# Patient Record
Sex: FEMALE | Race: WHITE | NOT HISPANIC OR LATINO
[De-identification: names, ages, dates, MRNs, and addresses within clinical notes are randomized per-mention and may not be internally consistent; named-entity substitution may affect disease eponyms.]

---

## 2022-05-31 ENCOUNTER — APPOINTMENT (OUTPATIENT)
Dept: OTOLARYNGOLOGY | Facility: CLINIC | Age: 72
End: 2022-05-31
Payer: MEDICARE

## 2022-05-31 VITALS
HEIGHT: 61 IN | TEMPERATURE: 96.8 F | SYSTOLIC BLOOD PRESSURE: 130 MMHG | WEIGHT: 111.4 LBS | HEART RATE: 75 BPM | BODY MASS INDEX: 21.03 KG/M2 | DIASTOLIC BLOOD PRESSURE: 78 MMHG

## 2022-05-31 DIAGNOSIS — Z83.3 FAMILY HISTORY OF DIABETES MELLITUS: ICD-10-CM

## 2022-05-31 DIAGNOSIS — Z86.79 PERSONAL HISTORY OF OTHER DISEASES OF THE CIRCULATORY SYSTEM: ICD-10-CM

## 2022-05-31 DIAGNOSIS — Z86.39 PERSONAL HISTORY OF OTHER ENDOCRINE, NUTRITIONAL AND METABOLIC DISEASE: ICD-10-CM

## 2022-05-31 PROBLEM — Z00.00 ENCOUNTER FOR PREVENTIVE HEALTH EXAMINATION: Status: ACTIVE | Noted: 2022-05-31

## 2022-05-31 PROCEDURE — 99203 OFFICE O/P NEW LOW 30 MIN: CPT

## 2022-05-31 NOTE — CONSULT LETTER
[Dear  ___] : Dear  [unfilled], [Courtesy Letter:] : I had the pleasure of seeing your patient, [unfilled], in my office today. [Consult Closing:] : Thank you very much for allowing me to participate in the care of this patient.  If you have any questions, please do not hesitate to contact me. [Sincerely,] : Sincerely, [FreeTextEntry3] : Suresh Quezada MD\par Department of Otolaryngology - Head and Neck Surgery\par [facility]

## 2022-05-31 NOTE — PHYSICAL EXAM
[FreeTextEntry1] : Ad: EAC narrow w edema, but patent; thick hard cerumen lodged in proximal EAC against TM, cannot remove, TM not visible\par As: EAC clear, TM intact, ME clear [Midline] : trachea located in midline position [Normal] : no rashes [FreeTextEntry2] : mild pain below right ear posterior to ramus; parotid areas flat

## 2022-05-31 NOTE — ASSESSMENT
[FreeTextEntry1] : 71F here for initial evaluation. Over the past 2 days or so she c/o right ear pain with diminished hearing and clogged feeling. Though the pain started 2 days ago, the clogged feeling started around 2 weeks ago after getting water in her right ear during showering and persisted. There is no drainage, tinnitus or vertigo. The left ear is fine.\par On exam, the right EAC is narrow due to edema, but patent w no granulation or pus; there is thick hard cerumen lodged in proximal EAC against TM, cannot remove and TM is not visible. There is mild pain on palpation below the right ear posterior to the ramus. The rest of the head and neck exam is unremarkable.\par She has an acute right otitis externa w canal edema and thick cerumen lodged against TM. The canal is patent. For now, will start ciprodex for topical treatment and also to loosen the cerumen. Will also start oral abx given EAC edema and DM. RTO 10 days for exam, debridement and to ensure improvement.

## 2022-05-31 NOTE — HISTORY OF PRESENT ILLNESS
[de-identified] : 71F here for initial evaluation.\par \par Over the past 2 days or so she c/o right ear pain with diminished hearing and clogged feeling. Though the pain started 2 days ago, the clogged feeling started around 2 weeks ago after getting water in her right ear during showering and persisted. There is no drainage, tinnitus or vertigo. The left ear is fine.\par She has DM.\par \par ROS otherwise unremarkable.

## 2022-06-07 ENCOUNTER — APPOINTMENT (OUTPATIENT)
Dept: OTOLARYNGOLOGY | Facility: CLINIC | Age: 72
End: 2022-06-07
Payer: MEDICARE

## 2022-06-07 VITALS — HEART RATE: 73 BPM | SYSTOLIC BLOOD PRESSURE: 129 MMHG | TEMPERATURE: 97.5 F | DIASTOLIC BLOOD PRESSURE: 78 MMHG

## 2022-06-07 PROCEDURE — 99213 OFFICE O/P EST LOW 20 MIN: CPT | Mod: 25

## 2022-06-07 PROCEDURE — 31575 DIAGNOSTIC LARYNGOSCOPY: CPT

## 2022-06-07 RX ORDER — ACETAMINOPHEN AND CODEINE 300; 30 MG/1; MG/1
300-30 TABLET ORAL
Qty: 20 | Refills: 0 | Status: ACTIVE | COMMUNITY
Start: 2022-06-07 | End: 1900-01-01

## 2022-06-07 RX ORDER — CIPROFLOXACIN HYDROCHLORIDE 500 MG/1
500 TABLET, FILM COATED ORAL
Qty: 20 | Refills: 0 | Status: ACTIVE | COMMUNITY
Start: 2022-05-31 | End: 1900-01-01

## 2022-06-07 RX ORDER — CIPROFLOXACIN AND DEXAMETHASONE 3; 1 MG/ML; MG/ML
0.3-0.1 SUSPENSION/ DROPS AURICULAR (OTIC)
Qty: 1 | Refills: 0 | Status: ACTIVE | COMMUNITY
Start: 2022-05-31 | End: 1900-01-01

## 2022-06-07 NOTE — PHYSICAL EXAM
[Nasal Endoscopy Performed] : nasal endoscopy was performed, see procedure section for findings [Midline] : trachea located in midline position [de-identified] : posterior opx clear [Normal] : no rashes [FreeTextEntry2] : mild pain below right ear posterior to ramus; parotid areas flat [FreeTextEntry1] : Ad: EAC narrow w edema, but patent and tender on instrumentation; wet cerumen/debris in EAC removed w suction. further debris lodged in proximal EAC against TM, cannot remove, TM not visible. no granulation --> cx taken\par As: EAC clear, TM intact, ME clear

## 2022-06-07 NOTE — PROCEDURE
[FreeTextEntry3] : Fiberoptic Endoscopy:\par nasal airways patent\par choanae clear, ETO patent, nasopharynx wnl\par upper airway widely patent\par no masses/lesions\par TVF symmetric and mobile

## 2022-06-07 NOTE — CONSULT LETTER
[Dear  ___] : Dear  [unfilled], [Courtesy Letter:] : I had the pleasure of seeing your patient, [unfilled], in my office today. [Consult Closing:] : Thank you very much for allowing me to participate in the care of this patient.  If you have any questions, please do not hesitate to contact me. [Sincerely,] : Sincerely, [FreeTextEntry3] : Suresh Quezada MD\par Department of Otolaryngology - Head and Neck Surgery\par [facility]

## 2022-06-07 NOTE — HISTORY OF PRESENT ILLNESS
[de-identified] : 71F here in followup.\par \par Since last seen 1 week ago, she was on oral cipro and ciprodex drops as recommended and remains unimproved. There is still diminished right sided hearing and severe right ear pain. There is also pain around her right jaw and sometimes she feels something stuck in her throat on the right side when swallowing.\par At prior visit, treated for acute right otitis externa.\par \par From prior note-\par Over the past 2 days or so she c/o right ear pain with diminished hearing and clogged feeling. Though the pain started 2 days ago, the clogged feeling started around 2 weeks ago after getting water in her right ear during showering and persisted. There is no drainage, tinnitus or vertigo. The left ear is fine.\par She has DM.\par \par ROS otherwise unremarkable.

## 2022-06-07 NOTE — ASSESSMENT
[FreeTextEntry1] : 71F here in followup. Since last seen 1 week ago, she has been on oral cipro and ciprodex drops as recommended and remains unimproved. There is still diminished right sided hearing and severe right ear pain. There is also pain around her right jaw and sometimes she feels something stuck in her throat on the right side when swallowing. At prior visit, treated for acute right otitis externa in the setting of 2 days of right ear pain with diminished hearing and clogged feeling. There is minimal right ear drainage; there is tinnitus or vertigo. The left ear is fine.\par On exam, the right EAC is very narrow due to edema; some debris was removed. There is no granulation or pus; there is thick cerumen lodged in proximal EAC against TM, cannot remove and TM is not visible. There is pain on palpation below the right ear posterior to the ramus. The rest of the head and neck exam, including nasal endoscopy and fiberoptic laryngoscopy, is unremarkable.\par She has a persistent acute right otitis externa w moderate canal edema and thick cerumen lodged against TM w severe right otalgia. She is no better with 7 days cipro/ciprodex. A wick was placed to hopefully aid in topical medication delivery. Exam is otherwise unremarkable. For now, continue ciprofloxacin and ciprodex especially given DM. I will f/u the cx in interim to ensure abx do not need to be changed. RTO 3 days for wick removal/exam.

## 2022-06-09 ENCOUNTER — APPOINTMENT (OUTPATIENT)
Dept: OTOLARYNGOLOGY | Facility: CLINIC | Age: 72
End: 2022-06-09

## 2022-06-10 ENCOUNTER — APPOINTMENT (OUTPATIENT)
Dept: OTOLARYNGOLOGY | Facility: CLINIC | Age: 72
End: 2022-06-10
Payer: MEDICARE

## 2022-06-10 VITALS
WEIGHT: 111 LBS | BODY MASS INDEX: 20.96 KG/M2 | HEART RATE: 67 BPM | DIASTOLIC BLOOD PRESSURE: 67 MMHG | SYSTOLIC BLOOD PRESSURE: 122 MMHG | HEIGHT: 61 IN | TEMPERATURE: 97.3 F

## 2022-06-10 DIAGNOSIS — Z78.9 OTHER SPECIFIED HEALTH STATUS: ICD-10-CM

## 2022-06-10 PROCEDURE — 92550 TYMPANOMETRY & REFLEX THRESH: CPT

## 2022-06-10 PROCEDURE — 92557 COMPREHENSIVE HEARING TEST: CPT

## 2022-06-10 PROCEDURE — 99213 OFFICE O/P EST LOW 20 MIN: CPT

## 2022-06-10 RX ORDER — SULFAMETHOXAZOLE AND TRIMETHOPRIM 800; 160 MG/1; MG/1
800-160 TABLET ORAL TWICE DAILY
Qty: 20 | Refills: 2 | Status: ACTIVE | COMMUNITY
Start: 2022-06-10 | End: 1900-01-01

## 2022-06-10 RX ORDER — NEOMYCIN AND POLYMYXIN B SULFATES AND HYDROCORTISONE OTIC 10; 3.5; 1 MG/ML; MG/ML; [USP'U]/ML
3.5-10000-1 SUSPENSION AURICULAR (OTIC) 4 TIMES DAILY
Qty: 1 | Refills: 0 | Status: ACTIVE | COMMUNITY
Start: 2022-06-10 | End: 1900-01-01

## 2022-06-10 RX ORDER — CLOTRIMAZOLE 10 MG/ML
1 SOLUTION TOPICAL
Qty: 1 | Refills: 0 | Status: DISCONTINUED | COMMUNITY
Start: 2022-06-10 | End: 2022-06-10

## 2022-06-10 NOTE — CONSULT LETTER
[Dear  ___] : Dear  [unfilled], [Courtesy Letter:] : I had the pleasure of seeing your patient, [unfilled], in my office today. [Consult Closing:] : Thank you very much for allowing me to participate in the care of this patient.  If you have any questions, please do not hesitate to contact me. [Sincerely,] : Sincerely, [FreeTextEntry3] : Suresh Quezada MD\par Department of Otolaryngology - Head and Neck Surgery\par [facility]

## 2022-06-10 NOTE — PROCEDURE
[FreeTextEntry3] : from prior:\par Fiberoptic Endoscopy:\par nasal airways patent\par choanae clear, ETO patent, nasopharynx wnl\par upper airway widely patent\par no masses/lesions\par TVF symmetric and mobile

## 2022-06-10 NOTE — PHYSICAL EXAM
[Midline] : trachea located in midline position [Normal] : no rashes [de-identified] : no pain, no trismus [de-identified] : posterior opx clear [FreeTextEntry2] : mild pain below right ear posterior to ramus; parotid areas flat [FreeTextEntry1] : Ad: wick nearly extruded, removed. EAC narrow w edema and debris, removed w suction. further debris lodged in proximal EAC against TM, cannot remove, TM not visible. no granulation --> cx taken. new wick replaced, tolerated well\par As: EAC clear, TM intact, ME clear

## 2022-06-10 NOTE — HISTORY OF PRESENT ILLNESS
[de-identified] : 71F here in followup.\par \par Since last seen 3 days ago, there is less ear pain. At prior visit, a right ear wick placed. She has continued with the cipro and ciprodex. There is still diminished right sided hearing and severe right ear pain. 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 SRT 45, 100% discrim\par L ear: normal hearing sloping to moderate SNHL. SRT 25, 100% discrim, type A\par \par From prior note-\par Over the past 2 days or so she c/o right ear pain with diminished hearing and clogged feeling. Though the pain started 2 days ago, the clogged feeling started around 2 weeks ago after getting water in her right ear during showering and persisted. There is no drainage, tinnitus or vertigo. The left ear is fine.\par She has DM.\par \par ROS otherwise unremarkable.

## 2022-06-10 NOTE — ASSESSMENT
[FreeTextEntry1] : 71F here in followup. Since last seen 3 days ago, there is less ear pain. At prior visit, a right ear wick was placed for persistent right otitis externa. She has continued with the cipro and ciprodex as recommended. There is still diminished right sided hearing and right ear pain. There is less pain around her right jaw. The left ear is fine.\par On exam, the ear wick was extruded and removed. The right EAC remains very narrow due to edema; some debris was removed and cx taken. There is no granulation or pus; there is thick cerumen lodged in proximal EAC against TM, cannot remove and TM is not visible. A new ear wick was placed. The rest of the head and neck exam is unremarkable.\par She has a persistent acute right otitis externa w moderately severe canal edema, no better, despite 10 days ciprodex/cipro. The ear wick was extruded so did not help and a new one was replaced, this time a bit deeper. Since cipro not working, will change to bactrim to cover for staph. Will also switch the ciprodex to cortisporin suspension. RTO 3 days for wick removal/exam.

## 2022-06-13 ENCOUNTER — APPOINTMENT (OUTPATIENT)
Dept: OTOLARYNGOLOGY | Facility: CLINIC | Age: 72
End: 2022-06-13

## 2022-06-13 DIAGNOSIS — H61.21 IMPACTED CERUMEN, RIGHT EAR: ICD-10-CM

## 2022-06-13 LAB — EAR NOSE AND THROAT CULTURE: ABNORMAL

## 2022-06-13 PROCEDURE — 99212 OFFICE O/P EST SF 10 MIN: CPT | Mod: 25

## 2022-06-13 PROCEDURE — 69210 REMOVE IMPACTED EAR WAX UNI: CPT

## 2022-06-13 NOTE — PHYSICAL EXAM
[de-identified] : no pain, no trismus [Midline] : trachea located in midline position [de-identified] : posterior opx clear [Normal] : no rashes [FreeTextEntry2] : mild pain below right ear posterior to ramus; parotid areas flat [FreeTextEntry1] : Ad: wick removed. EAC patent w mild edema. Thick moist debris obstructing proximal canal removed w suction. TM intact and mobile, no granulation. -->new cx taken\par As: EAC clear, TM intact, ME clear\par \par felt much improved after wick removal an debridement

## 2022-06-13 NOTE — ASSESSMENT
[FreeTextEntry1] : 71F here in followup. Since last seen 3 days ago, she feels much better. There is no pain. She is on the bactrim/cortisporin drops. At prior visit, a right ear wick was replaced snice initial one had extruded and canal remained very swollen. \par On exam, the ear wick was removed. Thick debris was removed from the right EAC; the TM is intact. She felt markedly better after debridement. \par Acute right otitis externa now much improved on current regimen. For now, doing very well. Continue bactrim/cortisporin susp. Maintain dry ears! RTO 1 week for wick exam.

## 2022-06-13 NOTE — HISTORY OF PRESENT ILLNESS
[de-identified] : 71F here in followup.\par \par Since last seen 3 days ago, she feels much better. There is no pain. She is on the bactrim/cortisporin drops.\par At prior visit, a right ear wick was replaced since initial one had extruded and canal remained very swollen. 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 SRT 45, 100% discrim\par L ear: normal hearing sloping to moderate SNHL. SRT 25, 100% discrim, type A\par \par From prior note-\par Over the past 2 days or so she c/o right ear pain with diminished hearing and clogged feeling. Though the pain started 2 days ago, the clogged feeling started around 2 weeks ago after getting water in her right ear during showering and persisted. There is no drainage, tinnitus or vertigo. The left ear is fine.\par She has DM.\par \par ROS otherwise unremarkable.

## 2022-06-13 NOTE — CONSULT LETTER
[Dear  ___] : Dear  [unfilled], [Courtesy Letter:] : I had the pleasure of seeing your patient, [unfilled], in my office today. [Consult Closing:] : Thank you very much for allowing me to participate in the care of this patient.  If you have any questions, please do not hesitate to contact me. [Sincerely,] : Sincerely, [FreeTextEntry3] : Suresh Quezada MD\par Department of Otolaryngology - Head and Neck Surgery\par [facility]

## 2022-06-16 LAB — EAR NOSE AND THROAT CULTURE: ABNORMAL

## 2022-06-20 LAB — EAR NOSE AND THROAT CULTURE: ABNORMAL

## 2022-06-21 ENCOUNTER — APPOINTMENT (OUTPATIENT)
Dept: OTOLARYNGOLOGY | Facility: CLINIC | Age: 72
End: 2022-06-21
Payer: MEDICARE

## 2022-06-21 DIAGNOSIS — H92.01 OTALGIA, RIGHT EAR: ICD-10-CM

## 2022-06-21 DIAGNOSIS — H60.311 DIFFUSE OTITIS EXTERNA, RIGHT EAR: ICD-10-CM

## 2022-06-21 PROCEDURE — 99212 OFFICE O/P EST SF 10 MIN: CPT

## 2022-06-21 NOTE — ASSESSMENT
[FreeTextEntry1] : 71F here in followup. Since last seen 8 days ago, she feels much better. There is no pain. She finished the bactrim/cortisporin drops yesterday; she did not take the last dose of bactrim since she developed hives, which have since resolved. At prior visits she was treated for acute right otitis externa with otic drops, two rounds of oral abx and two wick placements in the setting of right ear pain with diminished hearing and clogged feeling. Exam remains markedly improved; the right EAC is (narrow but) widely patent w no granulation, debris or edema with an intact TM is intact. She felt markedly better after debridement. \par Acute right otitis externa remains much improved and resolved. For now, doing very well. Maintain dry ears and avoid qtips and any instrumenting. RTO as needed.

## 2022-06-21 NOTE — PHYSICAL EXAM
[de-identified] : no pain, no trismus [Midline] : trachea located in midline position [de-identified] : posterior opx clear [Normal] : no rashes [FreeTextEntry2] : mild pain below right ear posterior to ramus; parotid areas flat [FreeTextEntry1] : Ad: EAC narrow but widely patent, no granulation or edema. TM intact and mobile\par As: EAC narrow but widely patent, TM intact, ME clear\par

## 2022-06-21 NOTE — HISTORY OF PRESENT ILLNESS
Attempted to reach patient to schedule nurse visit (BP / edema check). Voicemail full. Will try again at a later time.    Heather Kent RN   [de-identified] : 71F here in followup.\par \par Since last seen 8 days ago, she feels much better. There is no pain. 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 SRT 45, 100% discrim\par L ear: normal hearing sloping to moderate SNHL. SRT 25, 100% discrim, type A\par \par She has DM.\par \par ROS otherwise unremarkable.

## 2022-06-21 NOTE — CONSULT LETTER
[Dear  ___] : Dear  [unfilled], [Courtesy Letter:] : I had the pleasure of seeing your patient, [unfilled], in my office today. [Consult Closing:] : Thank you very much for allowing me to participate in the care of this patient.  If you have any questions, please do not hesitate to contact me. [Sincerely,] : Sincerely, [FreeTextEntry3] : Suresh Quezada MD\par Department of Otolaryngology - Head and Neck Surgery\par [facility]

## 2022-06-24 PROBLEM — H61.21 EXCESSIVE CERUMEN IN RIGHT EAR CANAL: Status: ACTIVE | Noted: 2022-06-24
